# Patient Record
Sex: FEMALE | Race: WHITE | NOT HISPANIC OR LATINO
[De-identification: names, ages, dates, MRNs, and addresses within clinical notes are randomized per-mention and may not be internally consistent; named-entity substitution may affect disease eponyms.]

---

## 2019-05-06 ENCOUNTER — TRANSCRIPTION ENCOUNTER (OUTPATIENT)
Age: 19
End: 2019-05-06

## 2019-06-03 PROBLEM — Z00.00 ENCOUNTER FOR PREVENTIVE HEALTH EXAMINATION: Status: ACTIVE | Noted: 2019-06-03

## 2019-06-13 ENCOUNTER — APPOINTMENT (OUTPATIENT)
Dept: BREAST CENTER | Facility: CLINIC | Age: 19
End: 2019-06-13
Payer: COMMERCIAL

## 2019-06-13 VITALS
TEMPERATURE: 97.8 F | BODY MASS INDEX: 22.82 KG/M2 | HEIGHT: 62 IN | DIASTOLIC BLOOD PRESSURE: 68 MMHG | SYSTOLIC BLOOD PRESSURE: 112 MMHG | WEIGHT: 124 LBS

## 2019-06-13 DIAGNOSIS — Z78.9 OTHER SPECIFIED HEALTH STATUS: ICD-10-CM

## 2019-06-13 DIAGNOSIS — Z80.3 FAMILY HISTORY OF MALIGNANT NEOPLASM OF BREAST: ICD-10-CM

## 2019-06-13 DIAGNOSIS — N60.02 SOLITARY CYST OF LEFT BREAST: ICD-10-CM

## 2019-06-13 PROCEDURE — 99203 OFFICE O/P NEW LOW 30 MIN: CPT

## 2019-06-24 NOTE — REVIEW OF SYSTEMS
[As Noted in HPI] : as noted in HPI [Breast Pain] : breast pain [Breast Lump] : breast lump [Negative] : Endocrine [Skin Lesions] : no skin lesions [Skin Wound] : no skin wound

## 2019-06-24 NOTE — PAST MEDICAL HISTORY
[Menstruating] : The patient is menstruating [Normal Amount/Duration] : it was of a normal amount and duration [Menarche Age ____] : age at menarche was [unfilled] [Irregular Cycle Intervals] : are  irregular [Total Preg ___] : G[unfilled] [History of Hormone Replacement Treatment] : has no history of hormone replacement treatment [FreeTextEntry4] : irregular cycles since stopping birth control  [FreeTextEntry5] : denies  [FreeTextEntry6] : denies [FreeTextEntry7] : yes in past x 3 years, stopped four months prior  [FreeTextEntry8] : n/a

## 2019-06-24 NOTE — REVIEW OF SYSTEMS
[As Noted in HPI] : as noted in HPI [Breast Pain] : breast pain [Breast Lump] : breast lump [Negative] : Heme/Lymph [Skin Lesions] : no skin lesions [Skin Wound] : no skin wound

## 2019-06-24 NOTE — PHYSICAL EXAM
[Normocephalic] : normocephalic [Atraumatic] : atraumatic [EOMI] : extra ocular movement intact [No Supraclavicular Adenopathy] : no supraclavicular adenopathy [No Cervical Adenopathy] : no cervical adenopathy [Symmetrical] : symmetrical [Examined in the supine and seated position] : examined in the supine and seated position [No dominant masses] : no dominant masses in right breast  [No Nipple Retraction] : no left nipple retraction [No Axillary Lymphadenopathy] : no right axillary lymphadenopathy [No Nipple Discharge] : no left nipple discharge [Soft] : abdomen soft [Not Tender] : non-tender [No Rashes] : no rashes [No Edema] : no edema [No Ulceration] : no ulceration [de-identified] : no suspicious masses palpated  [de-identified] : @12:00, 2 cm FN, 1.5 cm mobile cyst, no overlying skin changes; no other suspicious masses palpated

## 2019-06-24 NOTE — ASSESSMENT
[FreeTextEntry1] : Yael is a 19 premenopausal F with a left breast cyst. \par \par On exam, she as a 1.5 cm cystic nodularity in the left superior breast at approximately 12:00, 2 cm FN.  This is likely the same breast cyst that was identified on her most recent US performed on 1/31/19.  At that time, her left breast cyst was @12N4, measuring 1 x 0.5 x 0.9 cm. \par \par We discussed breast cysts. They are not pre-malignant nor do they have malignant potential and are hormonally influenced.  They may grow or shrink in size as well as resolve spontaneously and there is usually no intervention unless they are symptomatic.  In several large studies, patients with breast cysts and a positive family history had a higher relative risk of breast cancer (from 1.5 to 3).  She is currently asymptomatic from her left breast cyst so no intervention will be performed at this time.  However we discussed that this may have increased in size recently due to her stopping of her OCPs and irregular menses suggesting some hormonal dysregulation. \par \par If her symptoms return, I have asked her to call back as she has the option of undergoing a L breast cyst aspiration should she become symptomatic.  \par \par She is otherwise at a slightly elevated risk for breast cancer based off her family history and should start annual screening mammograms at the age of 40 or ten years younger than the youngest relative (likely she should at least get a baseline mammogram at the age of 36).  This was discussed with her. \par \par All of her questions were answered.  She knows to call with any further questions or concerns. \par \par PLAN: \par -f/up in 7 months with a repeat L breast US\par -if symptoms returns, instructed to call back sooner for possible L breast cyst aspiration

## 2019-06-24 NOTE — PHYSICAL EXAM
[Normocephalic] : normocephalic [Atraumatic] : atraumatic [No Supraclavicular Adenopathy] : no supraclavicular adenopathy [EOMI] : extra ocular movement intact [Examined in the supine and seated position] : examined in the supine and seated position [Symmetrical] : symmetrical [No Cervical Adenopathy] : no cervical adenopathy [No dominant masses] : no dominant masses in right breast  [No Nipple Retraction] : no left nipple retraction [No Nipple Discharge] : no left nipple discharge [No Axillary Lymphadenopathy] : no right axillary lymphadenopathy [Not Tender] : non-tender [Soft] : abdomen soft [No Rashes] : no rashes [No Edema] : no edema [No Ulceration] : no ulceration [de-identified] : no suspicious masses palpated  [de-identified] : @12:00, 2 cm FN, 1.5 cm mobile cyst, no overlying skin changes; no other suspicious masses palpated

## 2019-06-24 NOTE — HISTORY OF PRESENT ILLNESS
[FreeTextEntry1] : Yael is a 19 premenopausal F who presents with a left breast cyst. \par \par She first noticed this left breast cyst in January of this year.  At that time, it was not painful, there were no overlying skin changes, and a US performed on 1/31/19 revealed a benign cyst @12N4, measuring 1 x 0.5 x 0.9 cm.  \par \par Starting about 1 week prior, she noticed that the cyst had increased in size, started to cause her pain, and the overlying skin was red.  Without any intervention however, her symptoms resolved within a week and she is no longer experiencing breast pain, redness of the skin and thinks that the cyst has decreased in size since then.  \par \par She otherwise denies any right sided breast complaints, denies any nipple discharge or retraction and has not palpated any other suspicious masses. \par \par HISTORICAL RISK FACTORS: \par -no prior breast biopsies or surgeries \par -family history of breast cancer in a paternal aunt, dx at age 46 and 60s (panel genetic testing was negative), several paternal second cousins at ages 40s to 60s, and a maternal great aunt, dx at age 50s. \par -G0\par -she was on birth control for three years, but stopped 2/2019\par \par left breast cyst, increased in size got red, resolved on its own\par \par

## 2019-06-24 NOTE — DATA REVIEWED
[FreeTextEntry1] : #[id number] US BREAST COMPLETE BILATERAL: 1/31/2019\par \par CLINICAL INDICATION: Patient does report CBE in the past year. Palpable lump left breast.\par \par No prior exams were available for comparison.\par Real-time ultrasound of both breasts was performed. Gray scale images of the real-time examination were reviewed.\par Ultrasound evaluation was performed including examination of all four quadrants of the breast(s) and the retroareolar regions.\par No suspicious abnormalities were seen sonographically in either breast. In the left breast at 12:00 location 4 cm from the nipple corresponding to the area of palpable lump there is a benign cyst measuring 1.0 x 0.5 x 0.9 cm.\par \par \par IMPRESSION: BENIGN\par There is no sonographic evidence of malignancy.\par Palpable lump in the left breast at 12:00 location corresponding to a benign cyst. Clinical follow-up is recommended.\par The patient will be sent a letter.\par Electronic Signature: I personally reviewed the images and agree with this report. Final Report: Signed by Attending Opal Corado MD\par se/:1/31/2019 09:54:03\par \par \par letter sent: Normal Need Clinical Followup\par Ultrasound BI-RADS: 2 Benign\par

## 2019-06-24 NOTE — PAST MEDICAL HISTORY
[Menstruating] : The patient is menstruating [Menarche Age ____] : age at menarche was [unfilled] [Normal Amount/Duration] : it was of a normal amount and duration [Total Preg ___] : G[unfilled] [Irregular Cycle Intervals] : are  irregular [History of Hormone Replacement Treatment] : has no history of hormone replacement treatment [FreeTextEntry4] : irregular cycles since stopping birth control  [FreeTextEntry5] : denies  [FreeTextEntry6] : denies [FreeTextEntry7] : yes in past x 3 years, stopped four months prior  [FreeTextEntry8] : n/a

## 2019-06-24 NOTE — DATA REVIEWED
[FreeTextEntry1] : #[id number] US BREAST COMPLETE BILATERAL: 1/31/2019\par \par CLINICAL INDICATION: Patient does report CBE in the past year. Palpable lump left breast.\par \par No prior exams were available for comparison.\par Real-time ultrasound of both breasts was performed. Gray scale images of the real-time examination were reviewed.\par Ultrasound evaluation was performed including examination of all four quadrants of the breast(s) and the retroareolar regions.\par No suspicious abnormalities were seen sonographically in either breast. In the left breast at 12:00 location 4 cm from the nipple corresponding to the area of palpable lump there is a benign cyst measuring 1.0 x 0.5 x 0.9 cm.\par \par \par IMPRESSION: BENIGN\par There is no sonographic evidence of malignancy.\par Palpable lump in the left breast at 12:00 location corresponding to a benign cyst. Clinical follow-up is recommended.\par The patient will be sent a letter.\par Electronic Signature: I personally reviewed the images and agree with this report. Final Report: Signed by Attending Opal Corado MD\par se/:1/31/2019 09:54:03\par \par \par letter sent: Normal Need Clinical Followup\par Ultrasound BI-RADS: 2 Benign\par

## 2019-06-24 NOTE — CONSULT LETTER
[Dear  ___] : Dear  [unfilled], [Consult Letter:] : I had the pleasure of evaluating your patient, [unfilled]. [Consult Closing:] : Thank you very much for allowing me to participate in the care of this patient.  If you have any questions, please do not hesitate to contact me. [Please see my note below.] : Please see my note below. [Sincerely,] : Sincerely, [FreeTextEntry2] : Zack Lewis MD\par [address] [place] [address]\[place] [address]\par  [FreeTextEntry3] : Valerie Earl MD \par Breast Surgical Oncologist\par [facility] [place]\par [facility]\par [facility]

## 2019-06-24 NOTE — CONSULT LETTER
[Dear  ___] : Dear  [unfilled], [Consult Letter:] : I had the pleasure of evaluating your patient, [unfilled]. [Consult Closing:] : Thank you very much for allowing me to participate in the care of this patient.  If you have any questions, please do not hesitate to contact me. [Please see my note below.] : Please see my note below. [Sincerely,] : Sincerely, [FreeTextEntry2] : Zack Lewis MD\par [address] [place] [address]\[place] [address]\par  [FreeTextEntry3] : Valerie Earl MD \par Breast Surgical Oncologist\par [facility] [place]\par Health system\par [facility]

## 2019-09-24 ENCOUNTER — TRANSCRIPTION ENCOUNTER (OUTPATIENT)
Age: 19
End: 2019-09-24

## 2020-09-09 ENCOUNTER — TRANSCRIPTION ENCOUNTER (OUTPATIENT)
Age: 20
End: 2020-09-09

## 2020-10-06 ENCOUNTER — TRANSCRIPTION ENCOUNTER (OUTPATIENT)
Age: 20
End: 2020-10-06

## 2021-03-25 ENCOUNTER — APPOINTMENT (OUTPATIENT)
Dept: CARDIOLOGY | Facility: CLINIC | Age: 21
End: 2021-03-25
Payer: COMMERCIAL

## 2021-03-25 VITALS
HEART RATE: 74 BPM | BODY MASS INDEX: 25.03 KG/M2 | DIASTOLIC BLOOD PRESSURE: 72 MMHG | WEIGHT: 136 LBS | TEMPERATURE: 98 F | SYSTOLIC BLOOD PRESSURE: 105 MMHG | HEIGHT: 62 IN

## 2021-03-25 PROCEDURE — 99205 OFFICE O/P NEW HI 60 MIN: CPT

## 2021-03-25 PROCEDURE — 99072 ADDL SUPL MATRL&STAF TM PHE: CPT

## 2021-03-25 PROCEDURE — 93000 ELECTROCARDIOGRAM COMPLETE: CPT

## 2021-03-25 NOTE — HISTORY OF PRESENT ILLNESS
[FreeTextEntry1] : I had a pleasure of seeing Ms. MOURA for initial consultation for palpitations. She is accompanied by her mother today. \par \par Ms. MOURA is a 20 year-year old female with history of DL, anxiety? is here for palpitations for few months.\par \par palpitations described as 'fluttering' or racing of heart beats 3-4 beats at a time on daily basis, longest for few minutes. No triggering factors identified. More at rest. never at exertion/exercise/treadmill. No associated symptoms.\par \par Extensive work-up done by Dr. Randhawa with no identifiable etiology.\par \par FHx: Uncle (Mother's brother)  at 37 - ?HOCM, Aunt (Mother's sister)- HOCM- details not available to family. No premature CAD/ICD placement.\par SHx: No smoking, alcoholism, IVDA, marijuana use, excessive caffeinated products/herbal products. Prior heavy caffeine use, prior vaping (>4 months ago)\par \par \par EKG: SR @ 80/min, with small run of AT?/ST at the beginning of the EKG strip.\par TTE\par Stress test\par Holter reports -- please see Dr. Randhawa s notes scanned.\par \par

## 2021-03-25 NOTE — ASSESSMENT
[FreeTextEntry1] : ## Palpitations\par \par - Etiology remains unknown- ?PAC, non-sustained AT.\par - Extensive work-up by Dr. Randhawa\par - Will obtain event monitor strips from Dr. Randhawa s office. According to his report, I hardly see any concerning findings. However, often AT can be mistaken to ST. We will assess that.\par - We discussed possibility of repeat event monitor , rebeca if we don’t get rhythm strips or long-term monitoring with ILR. They are not interested at this time.\par - Discussed possibility of meds for short-term. No decision at this time\par - Further management upon findings.

## 2021-03-25 NOTE — PHYSICAL EXAM
[General Appearance - Well Developed] : well developed [Normal Appearance] : normal appearance [Well Groomed] : well groomed [General Appearance - Well Nourished] : well nourished [No Deformities] : no deformities [General Appearance - In No Acute Distress] : no acute distress [Normal Conjunctiva] : the conjunctiva exhibited no abnormalities [Eyelids - No Xanthelasma] : the eyelids demonstrated no xanthelasmas [Normal Oral Mucosa] : normal oral mucosa [No Oral Pallor] : no oral pallor [No Oral Cyanosis] : no oral cyanosis [Normal Jugular Venous A Waves Present] : normal jugular venous A waves present [Normal Jugular Venous V Waves Present] : normal jugular venous V waves present [No Jugular Venous Hua A Waves] : no jugular venous hua A waves [Heart Rate And Rhythm] : heart rate and rhythm were normal [Heart Sounds] : normal S1 and S2 [Murmurs] : no murmurs present [Respiration, Rhythm And Depth] : normal respiratory rhythm and effort [Exaggerated Use Of Accessory Muscles For Inspiration] : no accessory muscle use [Auscultation Breath Sounds / Voice Sounds] : lungs were clear to auscultation bilaterally [Abdomen Soft] : soft [Abdomen Tenderness] : non-tender [Abdomen Mass (___ Cm)] : no abdominal mass palpated [Abnormal Walk] : normal gait [Gait - Sufficient For Exercise Testing] : the gait was sufficient for exercise testing [Nail Clubbing] : no clubbing of the fingernails [Cyanosis, Localized] : no localized cyanosis [Petechial Hemorrhages (___cm)] : no petechial hemorrhages [Skin Color & Pigmentation] : normal skin color and pigmentation [] : no rash [No Venous Stasis] : no venous stasis [Skin Lesions] : no skin lesions [No Skin Ulcers] : no skin ulcer [No Xanthoma] : no  xanthoma was observed [Oriented To Time, Place, And Person] : oriented to person, place, and time [Affect] : the affect was normal [Mood] : the mood was normal [No Anxiety] : not feeling anxious

## 2021-03-30 ENCOUNTER — TRANSCRIPTION ENCOUNTER (OUTPATIENT)
Age: 21
End: 2021-03-30

## 2021-04-08 ENCOUNTER — APPOINTMENT (OUTPATIENT)
Dept: CARDIOLOGY | Facility: CLINIC | Age: 21
End: 2021-04-08
Payer: COMMERCIAL

## 2021-04-08 VITALS
TEMPERATURE: 98.2 F | SYSTOLIC BLOOD PRESSURE: 122 MMHG | BODY MASS INDEX: 23.92 KG/M2 | DIASTOLIC BLOOD PRESSURE: 72 MMHG | HEART RATE: 88 BPM | WEIGHT: 135 LBS | HEIGHT: 63 IN

## 2021-04-08 PROCEDURE — 99072 ADDL SUPL MATRL&STAF TM PHE: CPT

## 2021-04-08 PROCEDURE — 99213 OFFICE O/P EST LOW 20 MIN: CPT

## 2021-04-08 PROCEDURE — 93000 ELECTROCARDIOGRAM COMPLETE: CPT | Mod: 59

## 2021-04-08 PROCEDURE — 93228 REMOTE 30 DAY ECG REV/REPORT: CPT

## 2021-04-08 NOTE — PHYSICAL EXAM
[General Appearance - Well Developed] : well developed [Normal Appearance] : normal appearance [Well Groomed] : well groomed [General Appearance - Well Nourished] : well nourished [No Deformities] : no deformities [General Appearance - In No Acute Distress] : no acute distress [Heart Rate And Rhythm] : heart rate and rhythm were normal [Murmurs] : no murmurs present [Heart Sounds] : normal S1 and S2 [Respiration, Rhythm And Depth] : normal respiratory rhythm and effort [Exaggerated Use Of Accessory Muscles For Inspiration] : no accessory muscle use [Auscultation Breath Sounds / Voice Sounds] : lungs were clear to auscultation bilaterally [Abdomen Soft] : soft [Abdomen Tenderness] : non-tender [Abdomen Mass (___ Cm)] : no abdominal mass palpated [Nail Clubbing] : no clubbing of the fingernails [Cyanosis, Localized] : no localized cyanosis [] : no ischemic changes [Petechial Hemorrhages (___cm)] : no petechial hemorrhages

## 2021-04-08 NOTE — ASSESSMENT
[FreeTextEntry1] : ## Palpitations\par \par - Etiology remains unknown.\par - Extensive work-up by Dr. Randhawa\par - Reviewed event monitor strips from Dr. Randhawa s office. Episodes appears to be consistent with sinus tachycardia- according to patient, she was having allergic reaction at that time.\par - Offered her ILR /repeat event monitor- refused at this time. Discussed possibility of self- monitoring with Elias and call us if any abnormalities seen.\par -  Discussed possibility of meds for short-term. At this moment, they don’t want to try.\par - Return as needed\par \par \par

## 2021-04-08 NOTE — HISTORY OF PRESENT ILLNESS
[de-identified] : I had a pleasure of seeing Ms. MOURA for follow-up consultation for palpitations. She is accompanied by her mother today. \par \par Ms. MOURA is a 20 year-year old female with history of DL, anxiety? is here for palpitations for few months.\par \par palpitations described as 'fluttering' or racing of heart beats 3-4 beats at a time on daily basis, longest for few minutes. No triggering factors identified. More at rest. never at exertion/exercise/treadmill. No associated symptoms.\par \par Extensive work-up done by Dr. Randhawa with no identifiable etiology.\par \par FHx: Uncle (Mother's brother)  at 37 - ?HOCM, Aunt (Mother's sister)- HOCM- details not available to family. No premature CAD/ICD placement.\par SHx: No smoking, alcoholism, IVDA, marijuana use, excessive caffeinated products/herbal products. Prior heavy caffeine use, prior vaping (>4 months ago)\par \par : No new symptoms.\par \par EKG: SR @ 80/min, with small run of AT?/ST at the beginning of the EKG strip.\par TTE\par Stress test\par Holter reports -- please see Dr. Randhawa s notes scanned.

## 2021-12-05 ENCOUNTER — EMERGENCY (EMERGENCY)
Facility: HOSPITAL | Age: 21
LOS: 0 days | Discharge: HOME | End: 2021-12-05
Attending: EMERGENCY MEDICINE | Admitting: EMERGENCY MEDICINE
Payer: SELF-PAY

## 2021-12-05 VITALS
OXYGEN SATURATION: 99 % | TEMPERATURE: 99 F | HEART RATE: 88 BPM | DIASTOLIC BLOOD PRESSURE: 70 MMHG | SYSTOLIC BLOOD PRESSURE: 158 MMHG | WEIGHT: 132.94 LBS | RESPIRATION RATE: 18 BRPM

## 2021-12-05 DIAGNOSIS — O46.91 ANTEPARTUM HEMORRHAGE, UNSPECIFIED, FIRST TRIMESTER: ICD-10-CM

## 2021-12-05 DIAGNOSIS — Z3A.01 LESS THAN 8 WEEKS GESTATION OF PREGNANCY: ICD-10-CM

## 2021-12-05 DIAGNOSIS — O20.9 HEMORRHAGE IN EARLY PREGNANCY, UNSPECIFIED: ICD-10-CM

## 2021-12-05 DIAGNOSIS — O23.41 UNSPECIFIED INFECTION OF URINARY TRACT IN PREGNANCY, FIRST TRIMESTER: ICD-10-CM

## 2021-12-05 LAB
ALBUMIN SERPL ELPH-MCNC: 4.9 G/DL — SIGNIFICANT CHANGE UP (ref 3.5–5.2)
ALP SERPL-CCNC: 51 U/L — SIGNIFICANT CHANGE UP (ref 30–115)
ALT FLD-CCNC: 13 U/L — SIGNIFICANT CHANGE UP (ref 0–41)
ANION GAP SERPL CALC-SCNC: 17 MMOL/L — HIGH (ref 7–14)
APPEARANCE UR: ABNORMAL
AST SERPL-CCNC: 12 U/L — SIGNIFICANT CHANGE UP (ref 0–41)
BACTERIA # UR AUTO: ABNORMAL
BASOPHILS # BLD AUTO: 0.03 K/UL — SIGNIFICANT CHANGE UP (ref 0–0.2)
BASOPHILS NFR BLD AUTO: 0.4 % — SIGNIFICANT CHANGE UP (ref 0–1)
BILIRUB SERPL-MCNC: 0.7 MG/DL — SIGNIFICANT CHANGE UP (ref 0.2–1.2)
BILIRUB UR-MCNC: NEGATIVE — SIGNIFICANT CHANGE UP
BLD GP AB SCN SERPL QL: SIGNIFICANT CHANGE UP
BUN SERPL-MCNC: 7 MG/DL — LOW (ref 10–20)
CALCIUM SERPL-MCNC: 9.4 MG/DL — SIGNIFICANT CHANGE UP (ref 8.5–10.1)
CHLORIDE SERPL-SCNC: 99 MMOL/L — SIGNIFICANT CHANGE UP (ref 98–110)
CO2 SERPL-SCNC: 19 MMOL/L — SIGNIFICANT CHANGE UP (ref 17–32)
COLOR SPEC: SIGNIFICANT CHANGE UP
CREAT SERPL-MCNC: 0.6 MG/DL — LOW (ref 0.7–1.5)
DIFF PNL FLD: ABNORMAL
EOSINOPHIL # BLD AUTO: 0.04 K/UL — SIGNIFICANT CHANGE UP (ref 0–0.7)
EOSINOPHIL NFR BLD AUTO: 0.5 % — SIGNIFICANT CHANGE UP (ref 0–8)
EPI CELLS # UR: 4 /HPF — SIGNIFICANT CHANGE UP (ref 0–5)
GLUCOSE SERPL-MCNC: 79 MG/DL — SIGNIFICANT CHANGE UP (ref 70–99)
GLUCOSE UR QL: NEGATIVE — SIGNIFICANT CHANGE UP
HCG SERPL-ACNC: HIGH MIU/ML
HCT VFR BLD CALC: 39.4 % — SIGNIFICANT CHANGE UP (ref 37–47)
HGB BLD-MCNC: 13.5 G/DL — SIGNIFICANT CHANGE UP (ref 12–16)
HYALINE CASTS # UR AUTO: 4 /LPF — SIGNIFICANT CHANGE UP (ref 0–7)
IMM GRANULOCYTES NFR BLD AUTO: 0.4 % — HIGH (ref 0.1–0.3)
KETONES UR-MCNC: NEGATIVE — SIGNIFICANT CHANGE UP
LEUKOCYTE ESTERASE UR-ACNC: ABNORMAL
LYMPHOCYTES # BLD AUTO: 1.42 K/UL — SIGNIFICANT CHANGE UP (ref 1.2–3.4)
LYMPHOCYTES # BLD AUTO: 18.4 % — LOW (ref 20.5–51.1)
MCHC RBC-ENTMCNC: 30.8 PG — SIGNIFICANT CHANGE UP (ref 27–31)
MCHC RBC-ENTMCNC: 34.3 G/DL — SIGNIFICANT CHANGE UP (ref 32–37)
MCV RBC AUTO: 90 FL — SIGNIFICANT CHANGE UP (ref 81–99)
MONOCYTES # BLD AUTO: 0.66 K/UL — HIGH (ref 0.1–0.6)
MONOCYTES NFR BLD AUTO: 8.6 % — SIGNIFICANT CHANGE UP (ref 1.7–9.3)
NEUTROPHILS # BLD AUTO: 5.53 K/UL — SIGNIFICANT CHANGE UP (ref 1.4–6.5)
NEUTROPHILS NFR BLD AUTO: 71.7 % — SIGNIFICANT CHANGE UP (ref 42.2–75.2)
NITRITE UR-MCNC: NEGATIVE — SIGNIFICANT CHANGE UP
NRBC # BLD: 0 /100 WBCS — SIGNIFICANT CHANGE UP (ref 0–0)
PH UR: 6.5 — SIGNIFICANT CHANGE UP (ref 5–8)
PLATELET # BLD AUTO: 268 K/UL — SIGNIFICANT CHANGE UP (ref 130–400)
POTASSIUM SERPL-MCNC: 4.1 MMOL/L — SIGNIFICANT CHANGE UP (ref 3.5–5)
POTASSIUM SERPL-SCNC: 4.1 MMOL/L — SIGNIFICANT CHANGE UP (ref 3.5–5)
PROT SERPL-MCNC: 7.6 G/DL — SIGNIFICANT CHANGE UP (ref 6–8)
PROT UR-MCNC: NEGATIVE — SIGNIFICANT CHANGE UP
RBC # BLD: 4.38 M/UL — SIGNIFICANT CHANGE UP (ref 4.2–5.4)
RBC # FLD: 11.9 % — SIGNIFICANT CHANGE UP (ref 11.5–14.5)
RBC CASTS # UR COMP ASSIST: 2 /HPF — SIGNIFICANT CHANGE UP (ref 0–4)
SODIUM SERPL-SCNC: 135 MMOL/L — SIGNIFICANT CHANGE UP (ref 135–146)
SP GR SPEC: 1.01 — SIGNIFICANT CHANGE UP (ref 1.01–1.03)
UROBILINOGEN FLD QL: SIGNIFICANT CHANGE UP
WBC # BLD: 7.71 K/UL — SIGNIFICANT CHANGE UP (ref 4.8–10.8)
WBC # FLD AUTO: 7.71 K/UL — SIGNIFICANT CHANGE UP (ref 4.8–10.8)
WBC UR QL: 4 /HPF — SIGNIFICANT CHANGE UP (ref 0–5)

## 2021-12-05 PROCEDURE — 76830 TRANSVAGINAL US NON-OB: CPT | Mod: 26

## 2021-12-05 PROCEDURE — 99284 EMERGENCY DEPT VISIT MOD MDM: CPT

## 2021-12-05 RX ORDER — CEPHALEXIN 500 MG
1 CAPSULE ORAL
Qty: 28 | Refills: 0
Start: 2021-12-05 | End: 2021-12-11

## 2021-12-05 RX ORDER — SODIUM CHLORIDE 9 MG/ML
1000 INJECTION INTRAMUSCULAR; INTRAVENOUS; SUBCUTANEOUS ONCE
Refills: 0 | Status: COMPLETED | OUTPATIENT
Start: 2021-12-05 | End: 2021-12-05

## 2021-12-05 RX ADMIN — SODIUM CHLORIDE 1000 MILLILITER(S): 9 INJECTION INTRAMUSCULAR; INTRAVENOUS; SUBCUTANEOUS at 14:29

## 2021-12-05 NOTE — ED ADULT TRIAGE NOTE - CHIEF COMPLAINT QUOTE
pt presents 6 weeks pregnant  pt states yesterday was leaking clear  fluid & today started spotting brown and when wiping was blood pt denies heavy bleeding, c/o lower abdominal cramping LMP 10/19

## 2021-12-05 NOTE — ED PROVIDER NOTE - PATIENT PORTAL LINK FT
You can access the FollowMyHealth Patient Portal offered by [facility] by registering at the following website: [email or web address]. By joining [facility] [facility]’s FollowMyHealth portal, you will also be able to view your health information using other applications (apps) compatible with our system.

## 2021-12-05 NOTE — ED PROVIDER NOTE - CARE PROVIDER_API CALL
Van Schmitt  OBSTETRICS AND GYNECOLOGY  [address], [address]  [place] [address]  Phone: [phone]  Fax: [phone]  Follow Up Time:

## 2021-12-05 NOTE — ED PROVIDER NOTE - CLINICAL SUMMARY MEDICAL DECISION MAKING FREE TEXT BOX
22 yo F presented to ED for vaginal bleeding during pregnancy. No active bleeding. US demonstrates IUP with FHR. Pt has UTI on UA will treat. Pt has a GYN DC home with strict return precautions.

## 2021-12-05 NOTE — ED PROVIDER NOTE - CONSTITUTIONAL, MLM
none Well appearing, awake, alert, oriented to person, place, time/situation and in no apparent distress. normal...

## 2021-12-05 NOTE — ED PROVIDER NOTE - OBJECTIVE STATEMENT
20 y/o female  Lmp 10/19/21 presents to the ED c/o "I had some spotting with abdominal cramping this morning. yesterday I felt like I had some clear discharge yesterday. I haven't seen my gyn so far." no n/v/d/fever/ chills/ weakness

## 2021-12-05 NOTE — ED ADULT TRIAGE NOTE - MODE OF ARRIVAL
-- CST Phone (Incoming) Kiah Owens (Self) [phone] (H)          Alternative phone number: none Please leave a message if she isnt available    Turnaround time given to caller:   \"This message will be sent to [state Provider's name]. The clinical team will fulfill your request as soon as they review your message.\"    
Walk in

## 2021-12-05 NOTE — ED PROVIDER NOTE - NSFOLLOWUPINSTRUCTIONS_ED_ALL_ED_FT
Abnormal Uterine Bleeding  Abnormal uterine bleeding is unusual bleeding from the uterus. It includes:    Bleeding or spotting between periods.  Bleeding after sex.  Bleeding that is heavier than normal.  Periods that last longer than usual.  Bleeding after menopause.    Abnormal uterine bleeding can affect women at various stages in life, including teenagers, women in their reproductive years, pregnant women, and women who have reached menopause. Common causes of abnormal uterine bleeding include:    Pregnancy.  Growths of tissue (polyps).  A noncancerous tumor in the uterus (fibroid).  Infection.  Cancer.  Hormonal imbalances.    ImageAny type of abnormal bleeding should be evaluated by a health care provider. Many cases are minor and simple to treat, while others are more serious. Treatment will depend on the cause of the bleeding.    Follow these instructions at home:  Monitor your condition for any changes.  Do not use tampons, douche, or have sex if told by your health care provider.  Change your pads often.  Get regular exams that include pelvic exams and cervical cancer screening.  Keep all follow-up visits as told by your health care provider. This is important.  Contact a health care provider if:  Your bleeding lasts for more than one week.  You feel dizzy at times.  You feel nauseous or you vomit.  Get help right away if:  You pass out.  Your bleeding soaks through a pad every hour.  You have abdominal pain.  You have a fever.  You become sweaty or weak.  You pass large blood clots from your vagina.  Summary  Abnormal uterine bleeding is unusual bleeding from the uterus.  Any type of abnormal bleeding should be evaluated by a health care provider. Many cases are minor and simple to treat, while others are more serious.  Treatment will depend on the cause of the bleeding.  This information is not intended to replace advice given to you by your health care provider. Make sure you discuss any questions you have with your health care provider.    Urinary Tract Infection    A urinary tract infection (UTI) is an infection of any part of the urinary tract, which includes the kidneys, ureters, bladder, and urethra. Risk factors include ignoring your need to urinate, wiping back to front if female, being an uncircumcised male, and having diabetes or a weak immune system. Symptoms include frequent urination, pain or burning with urination, foul smelling urine, cloudy urine, pain in the lower abdomen, blood in the urine, and fever. If you were prescribed an antibiotic medicine, take it as told by your health care provider. Do not stop taking the antibiotic even if you start to feel better.    SEEK IMMEDIATE MEDICAL CARE IF YOU HAVE ANY OF THE FOLLOWING SYMPTOMS: severe back or abdominal pain, fever, inability to keep fluids or medicine down, dizziness/lightheadedness, or a change in mental status.

## 2021-12-05 NOTE — ED PROVIDER NOTE - ATTENDING CONTRIBUTION TO CARE
22 yo  F currently 6.5 weeks pregnant (LMP 10/19) presents to ED for 1 day vaginal bleeding. PT states she notices some brown discharge when she wipes. No clots. Pt has some abdominal cramping which she has had her entire pregnancy. Yesterday she states she noticed more clear fluid. No dysuria, vaginal discharge.   Pt is taking prenatal vitamins.   She has not seen a OBGYN yet for this pregnancy.     Const: Well nourished, well developed, appears stated age  Eyes: PERRL, no conjunctival injection  HENT:  Neck supple without meningismus   CV: RRR, Warm, well-perfused extremities  RESP: CTA B/L, no tachypnea   GI: soft, non-tender, non-distended    MSK: No gross deformities appreciated  Skin: Warm, dry. No rashes  Neuro: Alert, CNs II-XII grossly intact. Sensation and motor function of extremities grossly intact.  Psych: Appropriate mood and affect.    will do US, UA and bloodwork 22 yo  F currently 6.5 weeks pregnant (LMP 10/19) presents to ED for 1 day vaginal bleeding. PT states she notices some brown discharge when she wipes. No clots. Pt has some abdominal cramping which she has had her entire pregnancy. Yesterday she states she noticed more clear fluid. No dysuria, vaginal discharge.   Pt is taking prenatal vitamins.   She has not seen a OBGYN yet for this pregnancy.     Const: Well nourished, well developed, appears stated age  Eyes: PERRL, no conjunctival injection  HENT:  Neck supple without meningismus   CV: RRR, Warm, well-perfused extremities  RESP: CTA B/L, no tachypnea   GI: soft, non-tender, non-distended  : no vaginal bleeding or discharge   MSK: No gross deformities appreciated  Skin: Warm, dry. No rashes  Neuro: Alert, CNs II-XII grossly intact. Sensation and motor function of extremities grossly intact.  Psych: Appropriate mood and affect.    will do US, UA and blood work

## 2022-08-05 PROBLEM — Z78.9 OTHER SPECIFIED HEALTH STATUS: Chronic | Status: ACTIVE | Noted: 2021-12-05

## 2022-08-15 ENCOUNTER — OUTPATIENT (OUTPATIENT)
Dept: OUTPATIENT SERVICES | Facility: HOSPITAL | Age: 22
LOS: 1 days | Discharge: HOME | End: 2022-08-15

## 2022-08-16 ENCOUNTER — EMERGENCY (EMERGENCY)
Facility: HOSPITAL | Age: 22
LOS: 0 days | Discharge: HOME | End: 2022-08-17
Attending: EMERGENCY MEDICINE | Admitting: EMERGENCY MEDICINE

## 2022-08-16 DIAGNOSIS — R42 DIZZINESS AND GIDDINESS: ICD-10-CM

## 2022-08-16 DIAGNOSIS — U07.1 COVID-19: ICD-10-CM

## 2022-08-16 DIAGNOSIS — R55 SYNCOPE AND COLLAPSE: ICD-10-CM

## 2022-08-16 LAB — SARS-COV-2 RNA SPEC QL NAA+PROBE: DETECTED

## 2022-08-16 PROCEDURE — 99284 EMERGENCY DEPT VISIT MOD MDM: CPT

## 2022-08-17 VITALS
RESPIRATION RATE: 18 BRPM | TEMPERATURE: 99 F | OXYGEN SATURATION: 100 % | HEART RATE: 96 BPM | SYSTOLIC BLOOD PRESSURE: 114 MMHG | DIASTOLIC BLOOD PRESSURE: 71 MMHG

## 2022-08-17 PROCEDURE — 93010 ELECTROCARDIOGRAM REPORT: CPT

## 2022-08-17 NOTE — ED PROVIDER NOTE - NS ED ATTENDING STATEMENT MOD
This was a shared visit with the CAM. I reviewed and verified the documentation and independently performed the documented:

## 2022-08-17 NOTE — ED PROVIDER NOTE - PHYSICAL EXAMINATION
CONSTITUTIONAL: Well-developed; well-nourished; in no acute distress, nontoxic appearing  SKIN: skin exam is warm and dry  HEAD: Normocephalic; atraumatic.  EYES: PERRL, EOM intact; conjunctiva and sclera clear.  ENT: MMM  NECK: ROM intact  CARD: S1, S2 normal, no murmur  RESP: No wheezes, rales or rhonchi. Good air movement bilaterally  ABD: soft; non-distended; non-tender.    EXT: Normal ROM.    NEURO: awake, alert, following commands, oriented, grossly unremarkable. No Focal deficits. GCS 15.   PSYCH: Cooperative, appropriate.

## 2022-08-17 NOTE — ED ADULT TRIAGE NOTE - CHIEF COMPLAINT QUOTE
Pt brought down from upstairs was visiting peds and had near syncope. Pt endorses feeling weak x2 days, family is COVID+. Pt brought down from upstairs was visiting peds and had near syncope. Pt endorses feeling weak x2 days, family is COVID+.  Fs 99

## 2022-08-17 NOTE — ED PROVIDER NOTE - OBJECTIVE STATEMENT
22 year old female, no past medical history, who presents s/p vasovagal episode. 22 year old female, no past medical history, who presents s/p vasovagal episode. patient gave birth to child x3 weeks ago, recently diagnosed with covid x2 days ago, was upstairs with daughter who is admitted to hospital when she became lightheaded, felt warmth throughout body followed by episode of witnessed syncope, did not hit head/no loc. patient without post-ictal period, no urinary/bowel incontinence. patient endorses weakness and diarrhea. reports improvement of symptoms after drinking water. denies headache, vision changes, neck pain, chest pain, shortness of breath, hemoptysis, abd pain, vomiting, back pain.

## 2022-08-17 NOTE — ED ADULT NURSE NOTE - OBJECTIVE STATEMENT
Pt brought down from upstairs was visiting peds and had near syncope. Pt endorses feeling weak x2 days, family is COVID+.  Fs 99. Pt her room wqs hot with no AC and so, was exhausted, hence the near syncope.

## 2022-08-17 NOTE — ED PROVIDER NOTE - NS ED ROS FT
Review of Systems:  	•	CONSTITUTIONAL: +fever  	•	SKIN: no rash   	•	EYES: no eye pain, no blurry vision  	•	ENT: no tinnitus, no sore throat   	•	RESPIRATORY: no shortness of breath, no cough  	•	CARDIAC: no chest pain, no palpitations  	•	GI: no abd pain, no nausea, no vomiting, +diarrhea  	•	GENITO-URINARY: no discharge, no dysuria; no hematuria, no increased urinary frequency  	•	MUSCULOSKELETAL: no joint paint, no swelling, no redness  	•	NEUROLOGIC: +weakness, +syncope, no head injury, no headache   	•	PSYCH: no anxiety, non suicidal, non homicidal, no hallucination, no depression

## 2022-08-17 NOTE — ED PROVIDER NOTE - NSFOLLOWUPINSTRUCTIONS_ED_ALL_ED_FT
Please follow up with your primary care doctor in 1-3 days  Please be aware of any new or worsening signs or symptoms that should prompt your return to the ER.      You were noted to have what is called, a syncopal episode, which is an event when you temporarily lose consciousness. These events happen for a variety of reasons and you were kept in the hospital to evaluate what the cause of your event was. Thankfully, these events in themselves do not leave any long lasting effects on your body, however, they may happen again if the cause of the problem is not found and treated if need be. Many people never find out what caused their event. If you have been advised to follow up with any doctors, or specialists, please be sure to do so.

## 2022-08-17 NOTE — ED PROVIDER NOTE - ATTENDING APP SHARED VISIT CONTRIBUTION OF CARE
syncope as above. after getting up. asympt in ED.  didn't eat today. been with her 3 week old upstairs in the hospital, family has covid.  no cp, sob. no ab pain.     pt in nad,  ncat, perrl, eomi, neck sup, cta, rrr, ab soft, nt. nfd. no edema.    ekg r/o arrythmia.  fs.  supportive care

## 2022-09-08 ENCOUNTER — APPOINTMENT (OUTPATIENT)
Dept: NEUROLOGY | Facility: CLINIC | Age: 22
End: 2022-09-08

## 2022-11-15 NOTE — ED ADULT NURSE NOTE - NS ED NURSE LEVEL OF CONSCIOUSNESS MENTAL STATUS
Awake/Alert Manual Repair Warning Statement: We plan on removing the manually selected variable below in favor of our much easier automatic structured text blocks found in the previous tab. We decided to do this to help make the flow better and give you the full power of structured data. Manual selection is never going to be ideal in our platform and I would encourage you to avoid using manual selection from this point on, especially since I will be sunsetting this feature. It is important that you do one of two things with the customized text below. First, you can save all of the text in a word file so you can have it for future reference. Second, transfer the text to the appropriate area in the Library tab. Lastly, if there is a flap or graft type which we do not have you need to let us know right away so I can add it in before the variable is hidden. No need to panic, we plan to give you roughly 6 months to make the change.

## 2023-02-24 NOTE — ED ADULT NURSE NOTE - NS ED NURSE LEVEL OF CONSCIOUSNESS AFFECT
Rest, apply ice 3-4 times daily. May apply warm compresses/ heating pad for comfort. Take Tylenol 1,000 mg by mouth every 8 hours as needed for pain relief. May try ibuprofen 600 mg every 6 hours as needed. Monitor for new or progressive symptoms such as changing level of consciousness, tingling or weakness in extremities or any other unexplained symptoms.     Follow up with your Primary Care Provider (PCP) as needed. Return to the Urgent Care or Emergency Department for any new or worsening symptoms.   Calm